# Patient Record
(demographics unavailable — no encounter records)

---

## 2025-06-19 NOTE — HISTORY OF PRESENT ILLNESS
[de-identified] : 66-year-old female here for an evaluation of pain to the left knee, patient states that this pain is started about 5 days ago, she states that she was gardening and she believes she had a twisting injury to her knee. After that event she developed significant pain and swelling over the left knee.

## 2025-06-19 NOTE — DISCUSSION/SUMMARY
[de-identified] : Impression: Left knee injury, possible medial meniscal tear  Plan: Patient was advised to continue using the knee sleeve. Patient was advised for an MRI of the left knee to evaluate for a medial meniscal tear. A prescription for physical therapy was given.  Follow-up: 4 for repeat evaluation

## 2025-06-19 NOTE — PHYSICAL EXAM
[5___] : hamstring 5[unfilled]/5 [Positive] : positive Alaina [Negative] : negative anterior draw [] : antalgic [Left] : left knee [There are no fractures, subluxations or dislocations. No significant abnormalities are seen] : There are no fractures, subluxations or dislocations. No significant abnormalities are seen [FreeTextEntry3] : Generalized swelling of the left lower extremity